# Patient Record
Sex: FEMALE | Race: ASIAN | NOT HISPANIC OR LATINO | URBAN - METROPOLITAN AREA
[De-identification: names, ages, dates, MRNs, and addresses within clinical notes are randomized per-mention and may not be internally consistent; named-entity substitution may affect disease eponyms.]

---

## 2019-01-28 ENCOUNTER — EMERGENCY (EMERGENCY)
Facility: HOSPITAL | Age: 24
LOS: 1 days | Discharge: HOME | End: 2019-01-28
Attending: EMERGENCY MEDICINE | Admitting: EMERGENCY MEDICINE
Payer: MEDICAID

## 2019-01-28 VITALS
RESPIRATION RATE: 18 BRPM | TEMPERATURE: 98 F | OXYGEN SATURATION: 100 % | DIASTOLIC BLOOD PRESSURE: 66 MMHG | SYSTOLIC BLOOD PRESSURE: 110 MMHG | HEART RATE: 94 BPM

## 2019-01-28 VITALS
OXYGEN SATURATION: 100 % | DIASTOLIC BLOOD PRESSURE: 75 MMHG | TEMPERATURE: 98 F | HEART RATE: 120 BPM | SYSTOLIC BLOOD PRESSURE: 133 MMHG | RESPIRATION RATE: 16 BRPM

## 2019-01-28 LAB
ALBUMIN SERPL ELPH-MCNC: 4.3 G/DL — SIGNIFICANT CHANGE UP (ref 3.3–5)
ALP SERPL-CCNC: 97 U/L — SIGNIFICANT CHANGE UP (ref 40–120)
ALT FLD-CCNC: 24 U/L — SIGNIFICANT CHANGE UP (ref 4–33)
ANION GAP SERPL CALC-SCNC: 18 MMO/L — HIGH (ref 7–14)
APPEARANCE UR: CLEAR — SIGNIFICANT CHANGE UP
AST SERPL-CCNC: 19 U/L — SIGNIFICANT CHANGE UP (ref 4–32)
BACTERIA # UR AUTO: SIGNIFICANT CHANGE UP
BASOPHILS # BLD AUTO: 0.08 K/UL — SIGNIFICANT CHANGE UP (ref 0–0.2)
BASOPHILS NFR BLD AUTO: 0.7 % — SIGNIFICANT CHANGE UP (ref 0–2)
BILIRUB SERPL-MCNC: 1.1 MG/DL — SIGNIFICANT CHANGE UP (ref 0.2–1.2)
BILIRUB UR-MCNC: NEGATIVE — SIGNIFICANT CHANGE UP
BLOOD UR QL VISUAL: NEGATIVE — SIGNIFICANT CHANGE UP
BUN SERPL-MCNC: 14 MG/DL — SIGNIFICANT CHANGE UP (ref 7–23)
CALCIUM SERPL-MCNC: 9.7 MG/DL — SIGNIFICANT CHANGE UP (ref 8.4–10.5)
CHLORIDE SERPL-SCNC: 98 MMOL/L — SIGNIFICANT CHANGE UP (ref 98–107)
CO2 SERPL-SCNC: 23 MMOL/L — SIGNIFICANT CHANGE UP (ref 22–31)
COLOR SPEC: YELLOW — SIGNIFICANT CHANGE UP
CREAT SERPL-MCNC: 0.65 MG/DL — SIGNIFICANT CHANGE UP (ref 0.5–1.3)
EOSINOPHIL # BLD AUTO: 0.14 K/UL — SIGNIFICANT CHANGE UP (ref 0–0.5)
EOSINOPHIL NFR BLD AUTO: 1.2 % — SIGNIFICANT CHANGE UP (ref 0–6)
GLUCOSE SERPL-MCNC: 100 MG/DL — HIGH (ref 70–99)
GLUCOSE UR-MCNC: NEGATIVE — SIGNIFICANT CHANGE UP
HCG SERPL-ACNC: SIGNIFICANT CHANGE UP MIU/ML
HCT VFR BLD CALC: 46.9 % — HIGH (ref 34.5–45)
HGB BLD-MCNC: 16 G/DL — HIGH (ref 11.5–15.5)
HYALINE CASTS # UR AUTO: HIGH
IMM GRANULOCYTES NFR BLD AUTO: 0.3 % — SIGNIFICANT CHANGE UP (ref 0–1.5)
KETONES UR-MCNC: >150 — HIGH
LEUKOCYTE ESTERASE UR-ACNC: NEGATIVE — SIGNIFICANT CHANGE UP
LIDOCAIN IGE QN: 18.3 U/L — SIGNIFICANT CHANGE UP (ref 7–60)
LYMPHOCYTES # BLD AUTO: 2.41 K/UL — SIGNIFICANT CHANGE UP (ref 1–3.3)
LYMPHOCYTES # BLD AUTO: 20 % — SIGNIFICANT CHANGE UP (ref 13–44)
MCHC RBC-ENTMCNC: 29 PG — SIGNIFICANT CHANGE UP (ref 27–34)
MCHC RBC-ENTMCNC: 34.1 % — SIGNIFICANT CHANGE UP (ref 32–36)
MCV RBC AUTO: 85 FL — SIGNIFICANT CHANGE UP (ref 80–100)
MONOCYTES # BLD AUTO: 0.93 K/UL — HIGH (ref 0–0.9)
MONOCYTES NFR BLD AUTO: 7.7 % — SIGNIFICANT CHANGE UP (ref 2–14)
MUCOUS THREADS # UR AUTO: SIGNIFICANT CHANGE UP
NEUTROPHILS # BLD AUTO: 8.45 K/UL — HIGH (ref 1.8–7.4)
NEUTROPHILS NFR BLD AUTO: 70.1 % — SIGNIFICANT CHANGE UP (ref 43–77)
NITRITE UR-MCNC: NEGATIVE — SIGNIFICANT CHANGE UP
NRBC # FLD: 0 K/UL — LOW (ref 25–125)
PH UR: 6.5 — SIGNIFICANT CHANGE UP (ref 5–8)
PLATELET # BLD AUTO: 304 K/UL — SIGNIFICANT CHANGE UP (ref 150–400)
PMV BLD: 11.2 FL — SIGNIFICANT CHANGE UP (ref 7–13)
POTASSIUM SERPL-MCNC: 3.3 MMOL/L — LOW (ref 3.5–5.3)
POTASSIUM SERPL-SCNC: 3.3 MMOL/L — LOW (ref 3.5–5.3)
PROT SERPL-MCNC: 7.7 G/DL — SIGNIFICANT CHANGE UP (ref 6–8.3)
PROT UR-MCNC: 50 — SIGNIFICANT CHANGE UP
RBC # BLD: 5.52 M/UL — HIGH (ref 3.8–5.2)
RBC # FLD: 12.3 % — SIGNIFICANT CHANGE UP (ref 10.3–14.5)
RBC CASTS # UR COMP ASSIST: SIGNIFICANT CHANGE UP (ref 0–?)
SODIUM SERPL-SCNC: 139 MMOL/L — SIGNIFICANT CHANGE UP (ref 135–145)
SP GR SPEC: 1.03 — SIGNIFICANT CHANGE UP (ref 1–1.04)
SQUAMOUS # UR AUTO: SIGNIFICANT CHANGE UP
UROBILINOGEN FLD QL: SIGNIFICANT CHANGE UP
WBC # BLD: 12.05 K/UL — HIGH (ref 3.8–10.5)
WBC # FLD AUTO: 12.05 K/UL — HIGH (ref 3.8–10.5)
WBC UR QL: HIGH (ref 0–?)

## 2019-01-28 PROCEDURE — 99284 EMERGENCY DEPT VISIT MOD MDM: CPT

## 2019-01-28 RX ORDER — DOXYLAMINE SUCCINATE AND PYRIDOXINE HYDROCHLORIDE, DELAYED RELEASE TABLETS 10 MG/10 MG 10; 10 MG/1; MG/1
2 TABLET, DELAYED RELEASE ORAL
Qty: 60 | Refills: 0 | OUTPATIENT
Start: 2019-01-28 | End: 2019-02-26

## 2019-01-28 RX ORDER — POTASSIUM CHLORIDE 20 MEQ
40 PACKET (EA) ORAL ONCE
Qty: 0 | Refills: 0 | Status: COMPLETED | OUTPATIENT
Start: 2019-01-28 | End: 2019-01-28

## 2019-01-28 RX ORDER — DOXYLAMINE SUCCINATE 25 MG
1 TABLET ORAL
Qty: 45 | Refills: 0 | OUTPATIENT
Start: 2019-01-28 | End: 2019-02-11

## 2019-01-28 RX ORDER — ONDANSETRON 8 MG/1
4 TABLET, FILM COATED ORAL ONCE
Qty: 0 | Refills: 0 | Status: COMPLETED | OUTPATIENT
Start: 2019-01-28 | End: 2019-01-28

## 2019-01-28 RX ORDER — SODIUM CHLORIDE 9 MG/ML
1000 INJECTION, SOLUTION INTRAVENOUS ONCE
Qty: 0 | Refills: 0 | Status: COMPLETED | OUTPATIENT
Start: 2019-01-28 | End: 2019-01-28

## 2019-01-28 RX ORDER — ACETAMINOPHEN 500 MG
650 TABLET ORAL ONCE
Qty: 0 | Refills: 0 | Status: DISCONTINUED | OUTPATIENT
Start: 2019-01-28 | End: 2019-01-28

## 2019-01-28 RX ORDER — DOXYLAMINE SUCCINATE 25 MG
1 TABLET ORAL
Qty: 30 | Refills: 0
Start: 2019-01-28 | End: 2019-02-26

## 2019-01-28 RX ORDER — SODIUM CHLORIDE 9 MG/ML
1000 INJECTION INTRAMUSCULAR; INTRAVENOUS; SUBCUTANEOUS ONCE
Qty: 0 | Refills: 0 | Status: COMPLETED | OUTPATIENT
Start: 2019-01-28 | End: 2019-01-28

## 2019-01-28 RX ORDER — PYRIDOXINE HCL (VITAMIN B6) 100 MG
1 TABLET ORAL
Qty: 30 | Refills: 0
Start: 2019-01-28 | End: 2019-02-26

## 2019-01-28 RX ORDER — ACETAMINOPHEN 500 MG
1000 TABLET ORAL ONCE
Qty: 0 | Refills: 0 | Status: COMPLETED | OUTPATIENT
Start: 2019-01-28 | End: 2019-01-28

## 2019-01-28 RX ORDER — CEPHALEXIN 500 MG
500 CAPSULE ORAL ONCE
Qty: 0 | Refills: 0 | Status: COMPLETED | OUTPATIENT
Start: 2019-01-28 | End: 2019-01-28

## 2019-01-28 RX ORDER — PYRIDOXINE HCL (VITAMIN B6) 100 MG
1 TABLET ORAL
Qty: 45 | Refills: 0 | OUTPATIENT
Start: 2019-01-28 | End: 2019-02-11

## 2019-01-28 RX ADMIN — ONDANSETRON 4 MILLIGRAM(S): 8 TABLET, FILM COATED ORAL at 20:08

## 2019-01-28 RX ADMIN — Medication 500 MILLIGRAM(S): at 20:49

## 2019-01-28 RX ADMIN — SODIUM CHLORIDE 1000 MILLILITER(S): 9 INJECTION INTRAMUSCULAR; INTRAVENOUS; SUBCUTANEOUS at 18:09

## 2019-01-28 RX ADMIN — ONDANSETRON 4 MILLIGRAM(S): 8 TABLET, FILM COATED ORAL at 18:09

## 2019-01-28 RX ADMIN — Medication 400 MILLIGRAM(S): at 18:09

## 2019-01-28 RX ADMIN — SODIUM CHLORIDE 1000 MILLILITER(S): 9 INJECTION, SOLUTION INTRAVENOUS at 20:08

## 2019-01-28 RX ADMIN — Medication 40 MILLIEQUIVALENT(S): at 21:41

## 2019-01-28 NOTE — ED PROVIDER NOTE - OBJECTIVE STATEMENT
23F hx hypothyroidism,  7 weeks confirmed iu 23F hx hypothyroidism, , 7 weeks confirmed iup by sono presents with nbnb hyperemesis and mild abdominal cramps  x 2 weeks. Pt went to ob who prescribed reglan but has not reduced her vomiting. more than 8 episodes a day with sore throat. diffuse abdominal cramping, non-radiating, assoc with decreased po. denies f/c, diarrhea, back pain, cp, ha, cough, dysuria, vaginal bleeding or discharge. 23F hx hypothyroidism, , 7 weeks confirmed iup by sono presents with nbnb hyperemesis and mild abdominal cramps  x 2 weeks. Pt went to ob who prescribed reglan but has not reduced her vomiting. more than 8 episodes a day with sore throat. diffuse abdominal cramping, non-radiating, assoc with decreased po. denies f/c, diarrhea, back pain, cp, ha, cough, dysuria, vaginal bleeding or discharge.    Attendinyo female who is , 7 weeks pregnant with IUP confirmed by ultrasound done by pt's OB last week, presents with nausea and vomiting for about 2 weeks.  cannot tolerate po liquids or food.  vomits every time she eats/drinks.  decreased urine output.  no abdominal pain.  no vaginal discharge.

## 2019-01-28 NOTE — ED PROVIDER NOTE - PROGRESS NOTE DETAILS
Dr Canales: PO challenge passed, will give po potassium and dc. Of note, pt note taking prenatal vitamins because "Tastes bad".  we have advised her there is over the counter gummy bear versions that taste better. She understands and will take it.

## 2019-01-28 NOTE — ED ADULT TRIAGE NOTE - CHIEF COMPLAINT QUOTE
pt comes to ED for hyperemesis pt is 7 weeks pregnant with a + IUP pt has bot been able to eat or drink for 1 week. pt VSS

## 2019-01-28 NOTE — ED PROVIDER NOTE - PHYSICAL EXAMINATION
GEN: Well appearing, well nourished, in no apparent distress.  HEAD: NCAT  HEENT: PERRL, Airway patent, non-erythematous pharynx, no exudates, uvula midline, dry MM, neck supple, no LAD, no JVD  LUNG: CTAB, no adventitious sounds, no retractions, no nasal flaring  CV: RRR, no murmurs,   Abd: soft, minimally TTP diffusely, gravid, no rebound or guarding, BS+ in all quadrants, no CVAT  MSK: WWP, Pulses 2+ in extremities, No edema   Neuro:  AAOx3, Ambulatory with stable gait.  Skin: Warm and dry, no evidence of rash  Psych: normal mood and affect

## 2019-01-28 NOTE — ED PROVIDER NOTE - NSFOLLOWUPINSTRUCTIONS_ED_ALL_ED_FT
1) Follow up with OBGYN  2) Return to the ER immediately for new or worsening symptoms including uncontrolled pain, uncontrolled vomiting .   3) Take acetaminophen 1 gm every 6 hours as needed for pain ]  4) Take doxylamine and pyridoxine for nausea as prescribed.   5) Please take the over the counter gummy bear version of prenatal vitamins.

## 2019-01-28 NOTE — ED PROVIDER NOTE - NS ED ROS FT
Constitutional: no fevers, no chills.  Eyes: no visual changes.  Ears: no ear drainage, no ear pain.  Nose: no nasal congestion.  Mouth/Throat: no sore throat.  Cardiovascular: no chest pain.  Respiratory: no shortness of breath, no wheezing, no cough  Gastrointestinal: +nausea, +vomiting, no diarrhea, +abdominal pain.  MSK: no flank pain, no back pain.  Genitourinary: no dysuria, no hematuria.  Skin: no rashes.  Neuro: no headache,   Psychiatric: no known mental health issues.

## 2019-01-28 NOTE — ED PROVIDER NOTE - MEDICAL DECISION MAKING DETAILS
23F hx hypothyroidism, , 7 weeks confirmed iup by sono presents with nbnb hyperemesis and mild abdominal cramps  x 2 weeks. zofran for nausea, ofirmev for pain, ivf, basic labs and lipase, dc on b6 and dihydroxamine.

## 2019-02-10 ENCOUNTER — EMERGENCY (EMERGENCY)
Facility: HOSPITAL | Age: 24
LOS: 1 days | Discharge: HOME | End: 2019-02-10
Attending: EMERGENCY MEDICINE | Admitting: EMERGENCY MEDICINE
Payer: MEDICAID

## 2019-02-10 VITALS
OXYGEN SATURATION: 99 % | TEMPERATURE: 98 F | RESPIRATION RATE: 18 BRPM | SYSTOLIC BLOOD PRESSURE: 125 MMHG | DIASTOLIC BLOOD PRESSURE: 73 MMHG

## 2019-02-10 PROCEDURE — 99285 EMERGENCY DEPT VISIT HI MDM: CPT | Mod: 25

## 2019-02-10 NOTE — ED ADULT TRIAGE NOTE - CHIEF COMPLAINT QUOTE
Pt states she is approximately 8 weeks pregnant, c/o n/v x2 days. Pt unable to tolerate PO intake. Denies abdominal pain. Denies PMH.

## 2019-02-11 VITALS
HEART RATE: 107 BPM | OXYGEN SATURATION: 100 % | RESPIRATION RATE: 16 BRPM | DIASTOLIC BLOOD PRESSURE: 75 MMHG | TEMPERATURE: 99 F | SYSTOLIC BLOOD PRESSURE: 115 MMHG

## 2019-02-11 LAB
APPEARANCE UR: CLEAR — SIGNIFICANT CHANGE UP
BACTERIA # UR AUTO: NEGATIVE — SIGNIFICANT CHANGE UP
BASOPHILS # BLD AUTO: 0.07 K/UL — SIGNIFICANT CHANGE UP (ref 0–0.2)
BASOPHILS NFR BLD AUTO: 0.6 % — SIGNIFICANT CHANGE UP (ref 0–2)
BILIRUB UR-MCNC: NEGATIVE — SIGNIFICANT CHANGE UP
BLOOD UR QL VISUAL: NEGATIVE — SIGNIFICANT CHANGE UP
COLOR SPEC: YELLOW — SIGNIFICANT CHANGE UP
EOSINOPHIL # BLD AUTO: 0.03 K/UL — SIGNIFICANT CHANGE UP (ref 0–0.5)
EOSINOPHIL NFR BLD AUTO: 0.2 % — SIGNIFICANT CHANGE UP (ref 0–6)
GLUCOSE UR-MCNC: NEGATIVE — SIGNIFICANT CHANGE UP
HCG SERPL-ACNC: SIGNIFICANT CHANGE UP MIU/ML
HCT VFR BLD CALC: 42.3 % — SIGNIFICANT CHANGE UP (ref 34.5–45)
HGB BLD-MCNC: 14.5 G/DL — SIGNIFICANT CHANGE UP (ref 11.5–15.5)
HYALINE CASTS # UR AUTO: NEGATIVE — SIGNIFICANT CHANGE UP
IMM GRANULOCYTES NFR BLD AUTO: 0.4 % — SIGNIFICANT CHANGE UP (ref 0–1.5)
KETONES UR-MCNC: >150 — HIGH
LEUKOCYTE ESTERASE UR-ACNC: NEGATIVE — SIGNIFICANT CHANGE UP
LYMPHOCYTES # BLD AUTO: 0.62 K/UL — LOW (ref 1–3.3)
LYMPHOCYTES # BLD AUTO: 5 % — LOW (ref 13–44)
MCHC RBC-ENTMCNC: 28.9 PG — SIGNIFICANT CHANGE UP (ref 27–34)
MCHC RBC-ENTMCNC: 34.3 % — SIGNIFICANT CHANGE UP (ref 32–36)
MCV RBC AUTO: 84.4 FL — SIGNIFICANT CHANGE UP (ref 80–100)
MONOCYTES # BLD AUTO: 0.75 K/UL — SIGNIFICANT CHANGE UP (ref 0–0.9)
MONOCYTES NFR BLD AUTO: 6 % — SIGNIFICANT CHANGE UP (ref 2–14)
NEUTROPHILS # BLD AUTO: 10.9 K/UL — HIGH (ref 1.8–7.4)
NEUTROPHILS NFR BLD AUTO: 87.8 % — HIGH (ref 43–77)
NITRITE UR-MCNC: NEGATIVE — SIGNIFICANT CHANGE UP
NRBC # FLD: 0 K/UL — LOW (ref 25–125)
PH UR: 6 — SIGNIFICANT CHANGE UP (ref 5–8)
PLATELET # BLD AUTO: 327 K/UL — SIGNIFICANT CHANGE UP (ref 150–400)
PMV BLD: 11.1 FL — SIGNIFICANT CHANGE UP (ref 7–13)
PROT UR-MCNC: 20 — SIGNIFICANT CHANGE UP
RBC # BLD: 5.01 M/UL — SIGNIFICANT CHANGE UP (ref 3.8–5.2)
RBC # FLD: 12.2 % — SIGNIFICANT CHANGE UP (ref 10.3–14.5)
RBC CASTS # UR COMP ASSIST: HIGH (ref 0–?)
SP GR SPEC: 1.02 — SIGNIFICANT CHANGE UP (ref 1–1.04)
SQUAMOUS # UR AUTO: SIGNIFICANT CHANGE UP
UROBILINOGEN FLD QL: NORMAL — SIGNIFICANT CHANGE UP
WBC # BLD: 12.42 K/UL — HIGH (ref 3.8–10.5)
WBC # FLD AUTO: 12.42 K/UL — HIGH (ref 3.8–10.5)
WBC UR QL: SIGNIFICANT CHANGE UP (ref 0–?)

## 2019-02-11 PROCEDURE — 99234 HOSP IP/OBS SM DT SF/LOW 45: CPT

## 2019-02-11 RX ORDER — SODIUM CHLORIDE 9 MG/ML
1000 INJECTION INTRAMUSCULAR; INTRAVENOUS; SUBCUTANEOUS ONCE
Qty: 0 | Refills: 0 | Status: COMPLETED | OUTPATIENT
Start: 2019-02-11 | End: 2019-02-11

## 2019-02-11 RX ORDER — LEVOTHYROXINE SODIUM 125 MCG
25 TABLET ORAL DAILY
Qty: 0 | Refills: 0 | Status: DISCONTINUED | OUTPATIENT
Start: 2019-02-11 | End: 2019-02-14

## 2019-02-11 RX ORDER — POTASSIUM CHLORIDE 20 MEQ
40 PACKET (EA) ORAL ONCE
Qty: 0 | Refills: 0 | Status: COMPLETED | OUTPATIENT
Start: 2019-02-11 | End: 2019-02-11

## 2019-02-11 RX ORDER — METOCLOPRAMIDE HCL 10 MG
1 TABLET ORAL
Qty: 12 | Refills: 0
Start: 2019-02-11

## 2019-02-11 RX ORDER — SODIUM CHLORIDE 9 MG/ML
1000 INJECTION, SOLUTION INTRAVENOUS
Qty: 0 | Refills: 0 | Status: DISCONTINUED | OUTPATIENT
Start: 2019-02-11 | End: 2019-02-14

## 2019-02-11 RX ORDER — SODIUM CHLORIDE 9 MG/ML
1000 INJECTION, SOLUTION INTRAVENOUS ONCE
Qty: 0 | Refills: 0 | Status: COMPLETED | OUTPATIENT
Start: 2019-02-11 | End: 2019-02-11

## 2019-02-11 RX ORDER — ONDANSETRON 8 MG/1
4 TABLET, FILM COATED ORAL ONCE
Qty: 0 | Refills: 0 | Status: COMPLETED | OUTPATIENT
Start: 2019-02-11 | End: 2019-02-11

## 2019-02-11 RX ORDER — METOCLOPRAMIDE HCL 10 MG
10 TABLET ORAL ONCE
Qty: 0 | Refills: 0 | Status: COMPLETED | OUTPATIENT
Start: 2019-02-11 | End: 2019-02-11

## 2019-02-11 RX ORDER — POTASSIUM CHLORIDE 20 MEQ
40 PACKET (EA) ORAL ONCE
Qty: 0 | Refills: 0 | Status: DISCONTINUED | OUTPATIENT
Start: 2019-02-11 | End: 2019-02-11

## 2019-02-11 RX ADMIN — SODIUM CHLORIDE 1000 MILLILITER(S): 9 INJECTION INTRAMUSCULAR; INTRAVENOUS; SUBCUTANEOUS at 01:08

## 2019-02-11 RX ADMIN — SODIUM CHLORIDE 150 MILLILITER(S): 9 INJECTION, SOLUTION INTRAVENOUS at 03:19

## 2019-02-11 RX ADMIN — SODIUM CHLORIDE 1000 MILLILITER(S): 9 INJECTION INTRAMUSCULAR; INTRAVENOUS; SUBCUTANEOUS at 02:17

## 2019-02-11 RX ADMIN — Medication 25 MICROGRAM(S): at 06:46

## 2019-02-11 RX ADMIN — SODIUM CHLORIDE 1000 MILLILITER(S): 9 INJECTION, SOLUTION INTRAVENOUS at 08:36

## 2019-02-11 RX ADMIN — Medication 10 MILLIGRAM(S): at 02:16

## 2019-02-11 RX ADMIN — Medication 40 MILLIEQUIVALENT(S): at 08:48

## 2019-02-11 RX ADMIN — ONDANSETRON 4 MILLIGRAM(S): 8 TABLET, FILM COATED ORAL at 01:08

## 2019-02-11 NOTE — ED CDU PROVIDER INITIAL DAY NOTE - PROGRESS NOTE DETAILS
Received sign out from CADEN Smith this morning that patient is pending po challenge this morning and reassessment. Pts second visit for hyperemesis, is on home diclegis that patient states isnt helping. pt has not required any antiemetics during sign out. is tolerating po intake. will dc with reglan and ob/gyn follow up. pt agrees with plan, ready for dc. ambulating around unit, no complaints. lungs clear, abdomen soft nontender, HR palpated 96.

## 2019-02-11 NOTE — ED PROVIDER NOTE - OBJECTIVE STATEMENT
24 y/o F 9 weeks pregnant with no significant PMHx presents to the ED c/o n/v with x7-8 episodes of vomiting. Recent vomit was at 11Pm today. Pt was seen here on 01/27/19 with same complaints Pt denotes she has been taking "sleeping pills". She also notes she saw her OBGYN and done an US 2 weeks ago. Pt states she has been taking Tylenol daily.  No other acute complaints at time of eval. 24 y/o F 9 weeks pregnant with no significant PMHx presents to the ED c/o n/v with x7-8 episodes of vomiting, nbnb. Recent vomit was at 11Pm today. Pt was seen here on 01/27/19 with same complaints Pt denotes she has been taking diclegis with no relief. She also notes she saw her OBGYN and done an US 2 weeks ago. Pt states she has been taking Tylenol daily.  No other acute complaints at time of eval. Denies chest pain, sob, abd pain, dysuria, vaginal bleeding, fever or chills.

## 2019-02-11 NOTE — ED CDU PROVIDER DISPOSITION NOTE - NSFOLLOWUPINSTRUCTIONS_ED_ALL_ED_FT
Rest, drink plenty of fluids.  Take Reglan three times a day before meals as needed for nausea. Follow up with your primary care physician in 48-72 hours- bring copies of your results. Follow up with your OB/GYN for further prenatal care. Return to the Emergency Department for abdominal pain, vaginal bleeding/discharge, worsening or persistent symptoms OR ANY NEW OR CONCERNING SYMPTOMS.

## 2019-02-11 NOTE — ED ADULT NURSE NOTE - NSIMPLEMENTINTERV_GEN_ALL_ED
Implemented All Universal Safety Interventions:  [place] to call system. Call bell, personal items and telephone within reach. Instruct patient to call for assistance. Room bathroom lighting operational. Non-slip footwear when patient is off stretcher. Physically safe environment: no spills, clutter or unnecessary equipment. Stretcher in lowest position, wheels locked, appropriate side rails in place.

## 2019-02-11 NOTE — ED PROVIDER NOTE - MEDICAL DECISION MAKING DETAILS
22 y/o F 9 weeks pregnant with no significant PMHx presents to the ED c/o n/v with x7-8 episodes of vomiting. Plan - Will give basic labs, Zofran, fluids, and reassess.

## 2019-02-11 NOTE — ED ADULT NURSE NOTE - OBJECTIVE STATEMENT
rec'd pt in room intake 8 with mother at bedside. pt is approx 9 weeks pregnant. ..confirmed by sono.... c/o vomiting 8-9 times a day for past few days. no abd pain or vag bleeding. no pain but c/o leg weakness.  pt was seen a couple weeks ago for the same. 20 gauge inserted right ac. blood sent. urine requested. started o meds and ivf as ordered

## 2019-02-11 NOTE — ED CDU PROVIDER INITIAL DAY NOTE - ATTENDING CONTRIBUTION TO CARE
23F 9weeks preg p/w N/V x 2 days; was seen here 2 weeks ago for hyperemesis, this is similar.  No pelvic c/o, no diarrhea.  Got zofran and reglan and fluids.  No pelvic c/o.  Feeling better overnight.  Pt appears dry, mild tachycardia, LG temp. Plan check Urine, rx additional bolus, PO challenge, d/c home on reglan AC meals TID.  Madeline PO here, no distress, nontender abd  VS:  unremarkable except tachycardia    GEN - NAD, ; A+O x3   HEAD - NC/AT     ENT - PEERL, EOMI, mucous membranes  dry , no discharge      NECK: Neck supple, non-tender without lymphadenopathy, no masses, no JVD  PULM - CTA b/l,  symmetric breath sounds  COR -  normal heart sounds    ABD - , gravid uterus, NT, soft,  BACK - no CVA tenderness, nontender spine     EXTREMS - no edema, no deformity, warm and well perfused    SKIN - no rash or bruising      NEUROLOGIC - alert, CN 2-12 intact, sensation nl, motor no focal deficit.

## 2019-02-11 NOTE — ED CDU PROVIDER DISPOSITION NOTE - CLINICAL COURSE
tion to Care: 23F 9weeks preg p/w N/V x 2 days; was seen here 2 weeks ago for hyperemesis, this is similar.  No pelvic c/o, no diarrhea.  Got zofran and reglan and fluids.  No pelvic c/o.  Feeling better overnight.  Pt appears dry, mild tachycardia, LG temp. Plan check Urine, rx additional bolus, PO challenge, d/c home on reglan AC meals TID.  Madeline PO here, no distress, nontender abd

## 2019-02-11 NOTE — ED CDU PROVIDER INITIAL DAY NOTE - OBJECTIVE STATEMENT
24 y/o F  9 weeks pregnant with no significant PMHx presents to the ED c/o n/v with x7-8 episodes of vomiting, nbnb. Recent vomit was at 11Pm today. Pt was seen here on 19 with same complaints Pt denotes she has been taking diclegis with no relief. She also notes she saw her OBGYN and done an US 2 weeks ago. Pt states she has been taking Tylenol daily.  No other acute complaints at time of eval. Denies headaches. neck pain, f/c/, cough, chest pain, sob, abd pain, pelvic pain, vaginal bleeding, dysuria, numbness/weakness/tingfling, sent to cdu for fluids, meds

## 2019-02-11 NOTE — ED CDU PROVIDER DISPOSITION NOTE - ATTENDING CONTRIBUTION TO CARE
tion to Care: 23F 9weeks preg p/w N/V x 2 days; was seen here 2 weeks ago for hyperemesis, this is similar.  No pelvic c/o, no diarrhea.  Got zofran and reglan and fluids.  No pelvic c/o.  Feeling better overnight.  Pt appears dry, mild tachycardia, LG temp. Plan check Urine, rx additional bolus, PO challenge, d/c home on reglan AC meals TID.  Madeline PO here, no distress, nontender abd  VS:  unremarkable except tachycardia    GEN - NAD, ; A+O x3   HEAD - NC/AT     ENT - PEERL, EOMI, mucous membranes  dry , no discharge      NECK: Neck supple, non-tender without lymphadenopathy, no masses, no JVD  PULM - CTA b/l,  symmetric breath sounds  COR -  normal heart sounds    ABD - , gravid uterus, NT, soft,  BACK - no CVA tenderness, nontender spine     EXTREMS - no edema, no deformity, warm and well perfused    SKIN - no rash or bruising      NEUROLOGIC - alert, CN 2-12 intact, sensation nl, motor no focal deficit.

## 2019-02-12 LAB
BACTERIA UR CULT: SIGNIFICANT CHANGE UP
SPECIMEN SOURCE: SIGNIFICANT CHANGE UP

## 2019-02-13 NOTE — ED POST DISCHARGE NOTE - RESULT SUMMARY
UCX: normal genitourinary ivette < 10,0000. Patient pregnant. Contacted patient told to follow up with OB and have repeat UA/UCX. Discussed with patient need to return to ED if symptoms don't continue to improve or recur or develops any new or worsening symptoms that are of concern.

## 2019-09-15 ENCOUNTER — OUTPATIENT (OUTPATIENT)
Dept: INPATIENT UNIT | Facility: HOSPITAL | Age: 24
LOS: 1 days | Discharge: HOME | End: 2019-09-15
Payer: MEDICAID

## 2019-09-15 ENCOUNTER — OUTPATIENT (OUTPATIENT)
Dept: INPATIENT UNIT | Facility: HOSPITAL | Age: 24
LOS: 1 days | Discharge: HOME | End: 2019-09-15

## 2019-09-15 VITALS — DIASTOLIC BLOOD PRESSURE: 71 MMHG | SYSTOLIC BLOOD PRESSURE: 111 MMHG | HEART RATE: 99 BPM

## 2019-09-15 VITALS
SYSTOLIC BLOOD PRESSURE: 111 MMHG | DIASTOLIC BLOOD PRESSURE: 71 MMHG | RESPIRATION RATE: 18 BRPM | HEART RATE: 99 BPM | TEMPERATURE: 98 F

## 2019-09-15 VITALS — DIASTOLIC BLOOD PRESSURE: 56 MMHG | HEART RATE: 100 BPM | SYSTOLIC BLOOD PRESSURE: 93 MMHG

## 2019-09-15 VITALS
HEART RATE: 119 BPM | RESPIRATION RATE: 16 BRPM | SYSTOLIC BLOOD PRESSURE: 104 MMHG | TEMPERATURE: 98 F | DIASTOLIC BLOOD PRESSURE: 70 MMHG

## 2019-09-15 DIAGNOSIS — O26.899 OTHER SPECIFIED PREGNANCY RELATED CONDITIONS, UNSPECIFIED TRIMESTER: ICD-10-CM

## 2019-09-15 DIAGNOSIS — Z3A.00 WEEKS OF GESTATION OF PREGNANCY NOT SPECIFIED: ICD-10-CM

## 2019-09-15 PROCEDURE — 59025 FETAL NON-STRESS TEST: CPT | Mod: 26

## 2019-09-15 RX ORDER — LEVOTHYROXINE SODIUM 125 MCG
1 TABLET ORAL
Qty: 0 | Refills: 0 | DISCHARGE

## 2019-09-15 NOTE — OB PROVIDER TRIAGE NOTE - HISTORY OF PRESENT ILLNESS
24yo P0 @ 39.4 wks SLIUP uncomp PNC at [place] here co lower abd cramping with spotting. Pt reports leaking of yellowish-white fluid this morning x 1. Pt reports GFM.    Pmhx-hypothyroidism  Pshx/Hosp-denies  Meds-PNV; levothyroxin 25mcg QD  NKDA  Past ob-denies  Gyn-denies

## 2019-09-15 NOTE — OB PROVIDER TRIAGE NOTE - PLAN OF CARE
D/W Dr. Gasca  D/C Home  No evidence of acute process or labor at this time  Normal fetal testing  Follow up at next scheduled prenatal visit  Return for decreased fetal movement, loss of fluid or vaginal bleeding  Signs and symptoms of labor reviewed

## 2019-09-15 NOTE — OB PROVIDER TRIAGE NOTE - NSOBPROVIDERNOTE_OBGYN_ALL_OB_FT
22yo P0 @ 39.4 wks SLIUP uncomp PNC @ [place] here co lower abd cramping and spotting  -SSE neg for ROM  -VE-1/80/-2/posterior  -BPP 8/8 with BURTON-15.7  -await NST 24yo P0 @ 39.4 wks SLIUP uncomp PNC @ [place] here co lower abd cramping and spotting  -SSE neg for ROM  -VE-1/80/-2/posterior  -BPP 8/8 with BURTON-15.7  -await NST    19:00- Received care of patient  19:15- Cat 1 tracing, occasional ctx on toco  Patient presented to Dr. Aguirre/ to be discharged

## 2019-09-15 NOTE — OB PROVIDER TRIAGE NOTE - ADDITIONAL INSTRUCTIONS
Follow up at next scheduled prenatal visit  Return for decreased fetal movement, loss of fluid or vaginal bleeding  Signs and symptoms of labor reviewed

## 2019-09-16 NOTE — OB PROVIDER TRIAGE NOTE - ADDITIONAL INSTRUCTIONS
Follow up at next scheduled prenatal visit on Wed 9/18/19  Return for decreased fetal movement, loss of fluid or decreased fetal movement  Signs and symptoms of labor reviewed

## 2019-09-16 NOTE — OB PROVIDER TRIAGE NOTE - HISTORY OF PRESENT ILLNESS
22yo P0 @ 39.4 wks  PNC at [place] presents with painful ctx, pain scale 6/10.  Reports good fetal movement, Denies LOF/VB    Pmhx-hypothyroidism  Pshx/Hosp-denies  Meds-PNV; levothyroxin 25mcg QD  NKDA  Past ob-denies  Gyn-denies

## 2019-09-16 NOTE — OB PROVIDER TRIAGE NOTE - PLAN OF CARE
D/C Home  D/W Dr. Andrade and Dr. Aguirre  No evidence of acute process, false labor at this time  Follow up at next scheduled prenatal visit on Wed 9/18/19  Return for decreased fetal movement, loss of fluid or decreased fetal movement  Signs and symptoms of labor reviewed

## 2022-10-12 NOTE — ED PROVIDER NOTE - ENMT, MLM
Sydni Parker
Airway patent, Nasal mucosa clear. Mouth with normal mucosa. Throat has no vesicles, no oropharyngeal exudates and uvula is midline.

## 2023-08-21 NOTE — OB RN TRIAGE NOTE - NS PRO ABUSE SCREEN AFRAID ANYONE YN
The patient was placed in dorsal lithotomy position with feet in stirrups.    The bladder was filled with 300 mL sterile water to gravity with a 60 mL storm tipped syringe, at which point she voiced a strong desire to void.  The catheter was removed. She voided 310 mL.  She tolerated the procedure well.    TAVON LobatoP-BC   
no

## 2023-12-22 NOTE — ED ADULT NURSE NOTE - MODE OF DISCHARGE
It was a pleasure working with you today!  I hope your condition improves rapidly!     As we discussed, I am concerned that you have a bulging disc in your low back causing pressure on a nerve going into your right leg.  You need an MRI for further clarification.  I placed an order for you.  Please call the radiology department right away to schedule this study.  Also, you need to see a neurosurgeon.  I placed a another referral, and they should be reaching out to you to line this up.  I would recommend that you call them as well to line up your consultation for 1-2 days after your MRI so that they can go over your results.  You can restart the medications that worked for you.  Updates of the medications were sent to the pharmacy for you.  Please rest.  Do not bend, lift, or twist anything for the next 1 week due to your back.  
Ambulatory

## 2024-05-20 NOTE — ED ADULT NURSE NOTE - AS TEMP SITE
May 20, 2024      Julianna Yee   [address] [place] [id number]      Dear Julianna Yee:    Our records indicate that you have missed, or failed to cancel, one or more appointments with us during the last 12 months.    [facility] requests that patients notify the provider's office of cancellations 24 hours prior to their appointment, and to arrive within 15 minutes of the scheduled appointment time.  For appointments scheduled on Monday, please notify the appointment desk by noon on Friday.      Cancelling your appointment with proper notice allows other patients the opportunity to be seen. The [facility] policy states that patient may be dismissed from the practice after three missed appointments within a 12-month period.    The following are your missed appointments:  Dr. Riggs on 05/20/24 at 9:00 am       Your health care is important to us.  Please call our office at your earliest convenience to reschedule your appointment or to inform us of any possible scheduling errors.      We look forward to hearing from you soon. Thank you for understanding our position in regards to this problem. If you have any questions, please don't hesitate to contact us.       Sincerely,  [facility]          
oral
